# Patient Record
Sex: MALE | Race: BLACK OR AFRICAN AMERICAN | NOT HISPANIC OR LATINO | Employment: OTHER | URBAN - METROPOLITAN AREA
[De-identification: names, ages, dates, MRNs, and addresses within clinical notes are randomized per-mention and may not be internally consistent; named-entity substitution may affect disease eponyms.]

---

## 2020-03-23 ENCOUNTER — TELEPHONE (OUTPATIENT)
Dept: FAMILY MEDICINE | Facility: CLINIC | Age: 65
End: 2020-03-23

## 2020-03-23 NOTE — TELEPHONE ENCOUNTER
----- Message from Carol Patterson sent at 3/23/2020 11:40 AM CDT -----  Contact: MARGARET CHAUHAN  Name of Who is Calling: MARGARET CHAUHAN      What is the request in detail: Would like to speak to staff in regards to wanting to make an appointment to establish care to have the indomethacin (INDOCIN) 25 MG capsule refilled. Please advise.       Can the clinic reply by [facility]: No      [facility]: [phone]

## 2021-10-13 ENCOUNTER — HOSPITAL ENCOUNTER (EMERGENCY)
Facility: OTHER | Age: 66
Discharge: HOME | End: 2021-10-13
Attending: EMERGENCY MEDICINE
Payer: MEDICARE

## 2021-10-13 VITALS
OXYGEN SATURATION: 99 % | DIASTOLIC BLOOD PRESSURE: 86 MMHG | RESPIRATION RATE: 18 BRPM | HEART RATE: 88 BPM | SYSTOLIC BLOOD PRESSURE: 178 MMHG | BODY MASS INDEX: 33.04 KG/M2 | HEIGHT: 68 IN | WEIGHT: 218 LBS | TEMPERATURE: 98 F

## 2021-10-13 DIAGNOSIS — M25.522 LEFT ELBOW PAIN: Primary | ICD-10-CM

## 2021-10-13 DIAGNOSIS — Z87.39 HISTORY OF GOUT: ICD-10-CM

## 2021-10-13 PROCEDURE — 99284 EMERGENCY DEPT VISIT MOD MDM: CPT | Mod: 25

## 2021-10-13 RX ORDER — ALLOPURINOL 100 MG/1
100 TABLET ORAL DAILY
Qty: 30 TABLET | Refills: 0 | Status: SHIPPED | OUTPATIENT
Start: 2021-10-13

## 2021-10-13 RX ORDER — DICLOFENAC SODIUM 10 MG/G
2 GEL TOPICAL 3 TIMES DAILY
Qty: 150 G | Refills: 0 | Status: SHIPPED | OUTPATIENT
Start: 2021-10-13

## 2023-01-18 ENCOUNTER — HOSPITAL ENCOUNTER (EMERGENCY)
Facility: OTHER | Age: 68
Discharge: HOME | End: 2023-01-19
Attending: EMERGENCY MEDICINE
Payer: MEDICARE

## 2023-01-18 DIAGNOSIS — M25.562 ACUTE PAIN OF LEFT KNEE: ICD-10-CM

## 2023-01-18 DIAGNOSIS — M10.9 ACUTE GOUT OF LEFT KNEE, UNSPECIFIED CAUSE: ICD-10-CM

## 2023-01-18 DIAGNOSIS — R52 PAIN: ICD-10-CM

## 2023-01-18 DIAGNOSIS — M25.462 EFFUSION OF LEFT KNEE: Primary | ICD-10-CM

## 2023-01-18 PROCEDURE — 36000 PLACE NEEDLE IN VEIN: CPT

## 2023-01-18 PROCEDURE — 96372 THER/PROPH/DIAG INJ SC/IM: CPT | Performed by: EMERGENCY MEDICINE

## 2023-01-18 PROCEDURE — 99284 EMERGENCY DEPT VISIT MOD MDM: CPT | Mod: 25

## 2023-01-18 PROCEDURE — 63600175 PHARM REV CODE 636 W HCPCS: Performed by: EMERGENCY MEDICINE

## 2023-01-18 RX ORDER — KETOROLAC TROMETHAMINE 30 MG/ML
10 INJECTION, SOLUTION INTRAMUSCULAR; INTRAVENOUS
Status: COMPLETED | OUTPATIENT
Start: 2023-01-18 | End: 2023-01-18

## 2023-01-18 RX ADMIN — KETOROLAC TROMETHAMINE 10 MG: 30 INJECTION, SOLUTION INTRAMUSCULAR; INTRAVENOUS at 09:01

## 2023-01-19 VITALS
DIASTOLIC BLOOD PRESSURE: 68 MMHG | SYSTOLIC BLOOD PRESSURE: 153 MMHG | RESPIRATION RATE: 18 BRPM | TEMPERATURE: 99 F | HEART RATE: 82 BPM | OXYGEN SATURATION: 100 %

## 2023-01-19 PROCEDURE — 25000003 PHARM REV CODE 250: Performed by: EMERGENCY MEDICINE

## 2023-01-19 RX ORDER — COLCHICINE 0.6 MG/1
0.6 TABLET, FILM COATED ORAL
Status: COMPLETED | OUTPATIENT
Start: 2023-01-19 | End: 2023-01-19

## 2023-01-19 RX ORDER — INDOMETHACIN 25 MG/1
25 CAPSULE ORAL
Qty: 15 CAPSULE | Refills: 0 | Status: SHIPPED | OUTPATIENT
Start: 2023-01-19

## 2023-01-19 RX ORDER — COLCHICINE 0.6 MG/1
0.6 TABLET ORAL DAILY
Qty: 30 TABLET | Refills: 0 | Status: SHIPPED | OUTPATIENT
Start: 2023-01-19 | End: 2023-02-18

## 2023-01-19 RX ORDER — DICLOFENAC SODIUM 10 MG/G
2 GEL TOPICAL 4 TIMES DAILY
Qty: 100 G | Refills: 0 | Status: SHIPPED | OUTPATIENT
Start: 2023-01-19

## 2023-01-19 RX ORDER — INDOMETHACIN 25 MG/1
50 CAPSULE ORAL
Status: COMPLETED | OUTPATIENT
Start: 2023-01-19 | End: 2023-01-19

## 2023-01-19 RX ADMIN — COLCHICINE 0.6 MG: 0.6 TABLET, FILM COATED ORAL at 01:01

## 2023-01-19 RX ADMIN — INDOMETHACIN 50 MG: 25 CAPSULE ORAL at 01:01

## 2023-01-19 NOTE — DISCHARGE INSTRUCTIONS
Mr. Rankin,    Thank you for letting me care for you today! It was nice meeting you, and I hope you feel better soon.   If you would like access to your chart and what was done today please utilize the [facility].   Please come back to [facility] for all of your future medical needs.    Our goal in the emergency department is to always give you outstanding care and exceptional service. You may receive a survey by mail or e-mail in the next week regarding your experience in our ED. We would greatly appreciate you completing and returning the survey. Your feedback provides us with a way to recognize our staff who give very good care and it helps us learn how to improve when your experience was below our aspiration of excellence.     Sincerely,    Yao Nichole MD  Board Certified Emergency Physician

## 2023-01-19 NOTE — ED PROVIDER NOTES
Encounter Date: 1/18/2023    SCRIBE #1 NOTE: I, Bisi Rascon, am scribing for, and in the presence of,  Yao Nichole MD.     History     Chief Complaint   Patient presents with    Alcohol Intoxication     Pt c/o trouble ambulating     Time seen by provider: 9:25 PM    Josh Rankin is a 67 y.o. male, with a PMHx of arthritis, HTN, and gout, who presents to the ED with complaint of left knee swelling that began yesterday. The patient reports that he drinks occasionally, but denies cigarette or illicit drug use. He notes that he was drinking at the time his knee began to swell. The patient reports having gout in his left knee in the past, as well as prior surgeries in both knees. He states that he takes Metformin and insulin. This is the extent of the patient's complaints today in the Emergency Department.      The history is provided by the patient.   Review of patient's allergies indicates:  No Known Allergies  Past Medical History:   Diagnosis Date    Diabetes mellitus     Gout, unspecified     Hypertension      Past Surgical History:   Procedure Laterality Date    CHOLECYSTECTOMY      KNEE ARTHROPLASTY      bilateral     No family history on file.  Social History     Tobacco Use    Smoking status: Never   Substance Use Topics    Alcohol use: Yes    Drug use: No     Review of Systems  Constitutional-no fever  HEENT-no congestion  Eyes-no redness  Respiratory-no shortness of breath  Cardio-no chest pain  GI-no abdominal pain  Endocrine-no cold intolerance  -no difficulty urinating  MSK-no myalgias. Notes left knee swelling.  Skin-no rashes  Allergy-no environmental allergy  Neurologic-, no headache  Hematology-no swollen nodes  Behavioral-no confusion   Physical Exam     Initial Vitals [01/18/23 2012]   BP Pulse Resp Temp SpO2   (!) 181/79 (!) 125 18 98.7 °F (37.1 °C) 100 %      MAP       --         Physical Exam  Constitutional: pleasant 68 yo man who appears mildly intoxicated  Eyes: Conjunctivae  normal.  ENT       Head: Normocephalic, atraumatic.       Nose: Normal external appearance        Mouth/Throat: no strigulous respirations   Hematological/Lymphatic/Immunilogical: no visible lymphadenopathy   Cardiovascular: Normal rate,   Respiratory: Normal respiratory effort.   Gastrointestinal: non distended   Musculoskeletal: Normal range of motion in all extremities. Moderate effusion in the left knee, tender to palpation  Strong dp pulse bilaterally  Neurologic: Alert, oriented. Normal speech and language. No gross focal neurologic deficits are appreciated.  Skin: Skin is warm, dry. No rash noted.  Psychiatric: Mood and affect are normal.    ED Course   Procedures  Labs Reviewed - No data to display       Imaging Results               X-Ray Knee 1 or 2 View Left (Final result)  Result time 01/19/23 00:05:05      Final result by Zelda Medina MD (01/19/23 00:05:05)                   Impression:      Question of an acute nondisplaced tibial articular surface fracture involving the lateral tibial spine.    Left knee effusion noted.    Degenerative changes.    This report was flagged in Epic as abnormal.      Electronically signed by: Zelda Medina  Date:    01/19/2023  Time:    00:05               Narrative:    EXAMINATION:  XR KNEE 1 OR 2 VIEW LEFT    CLINICAL HISTORY:  Pain, unspecified    TECHNIQUE:  AP lateral views of left knee were obtained    COMPARISON:  05/13/2014 left knee views    FINDINGS:  There is a small effusion.  Lucency through the base of the lateral tibial spine raises question of small fracture.  There is medial knee compartment narrowing again noted.  No other focal osseous abnormalities.                                       Medications   ketorolac injection 9.999 mg (9.999 mg Intramuscular Given 1/18/23 3482)   indomethacin capsule 50 mg (50 mg Oral Given 1/19/23 0129)   colchicine capsule/tablet 0.6 mg (0.6 mg Oral Given 1/19/23 0129)     Medical Decision Making:   History:   Old  Medical Records: I decided to obtain old medical records.  Old Records Summarized: records from clinic visits and records from previous admission(s).  Differential Diagnosis:   Knee effusion, gout, septic arthritis   Independently Interpreted Test(s):   I have ordered and independently interpreted X-rays - see summary below.       <> Summary of X-Ray Reading(s): Moderate effusion, lateral spine of tibia shows artifact, no over fracture.    Clinical Tests:   Radiological Study: Ordered and Reviewed  ED Management:  67 y stacy with effusion in knee which feels reminiscent  of gout flares in the past. XR shows effusion. No fever, chills systemic illness signs.  Plan for gout care, ortho follow up as needed, dc and return in case of worsening.         Scribe Attestation:   Scribe #1: I performed the above scribed service and the documentation accurately describes the services I performed. I attest to the accuracy of the note.                   Clinical Impression:   Final diagnoses:  [R52] Pain  [M25.462] Effusion of left knee (Primary)  [M25.562] Acute pain of left knee  [M10.9] Acute gout of left knee, unspecified cause        ED Disposition Condition    Discharge Stable          ED Prescriptions       Medication Sig Dispense Start Date End Date Auth. Provider    indomethacin (INDOCIN) 25 MG capsule Take 1 capsule (25 mg total) by mouth 3 (three) times daily with meals. 15 capsule 1/19/2023 -- Yao Nichole MD    colchicine (COLCRYS) 0.6 mg tablet Take 1 tablet (0.6 mg total) by mouth once daily. 30 tablet 1/19/2023 2/18/2023 Yao Nichole MD    diclofenac sodium (VOLTAREN) 1 % Gel Apply 2 g topically 4 (four) times daily. 100 g 1/19/2023 -- Yao Nichole MD          Follow-up Information       Follow up With Specialties Details Why Contact Info Additional Information    Damion Bergeron - Orthopedics [facility] Schedule an appointment as soon as possible for a visit  As needed, For a follow up  visit about today [address]  [facility] [address] [phone] [facility] - Main Building, 5th Floor Please park in [facility] and take Atrium elevator             Yao Nichole MD  01/19/23 2059

## 2023-01-23 ENCOUNTER — TELEPHONE (OUTPATIENT)
Dept: ORTHOPEDICS | Facility: CLINIC | Age: 68
End: 2023-01-23
Payer: MEDICARE

## 2023-08-23 ENCOUNTER — HOSPITAL ENCOUNTER (EMERGENCY)
Facility: OTHER | Age: 68
Discharge: HOME | End: 2023-08-24
Attending: EMERGENCY MEDICINE
Payer: MEDICARE

## 2023-08-23 DIAGNOSIS — F10.920 ALCOHOLIC INTOXICATION WITHOUT COMPLICATION: Primary | ICD-10-CM

## 2023-08-23 PROCEDURE — 96360 HYDRATION IV INFUSION INIT: CPT

## 2023-08-23 PROCEDURE — 25000003 PHARM REV CODE 250: Performed by: EMERGENCY MEDICINE

## 2023-08-23 PROCEDURE — 99284 EMERGENCY DEPT VISIT MOD MDM: CPT | Mod: 25

## 2023-08-23 RX ORDER — ACETAMINOPHEN 325 MG/1
650 TABLET ORAL
Status: COMPLETED | OUTPATIENT
Start: 2023-08-23 | End: 2023-08-23

## 2023-08-23 RX ORDER — SODIUM CHLORIDE 9 MG/ML
1000 INJECTION, SOLUTION INTRAVENOUS
Status: COMPLETED | OUTPATIENT
Start: 2023-08-23 | End: 2023-08-23

## 2023-08-23 RX ADMIN — ACETAMINOPHEN 650 MG: 325 TABLET, FILM COATED ORAL at 10:08

## 2023-08-23 RX ADMIN — SODIUM CHLORIDE 1000 ML: 9 INJECTION, SOLUTION INTRAVENOUS at 10:08

## 2023-08-24 VITALS
DIASTOLIC BLOOD PRESSURE: 89 MMHG | RESPIRATION RATE: 16 BRPM | TEMPERATURE: 99 F | OXYGEN SATURATION: 99 % | SYSTOLIC BLOOD PRESSURE: 172 MMHG | HEART RATE: 74 BPM

## 2023-08-24 PROCEDURE — 25000003 PHARM REV CODE 250: Performed by: EMERGENCY MEDICINE

## 2023-08-24 RX ORDER — ACETAMINOPHEN 325 MG/1
325 TABLET ORAL
Status: COMPLETED | OUTPATIENT
Start: 2023-08-24 | End: 2023-08-24

## 2023-08-24 RX ADMIN — ACETAMINOPHEN 325 MG: 325 TABLET, FILM COATED ORAL at 12:08

## 2023-08-24 NOTE — ED NOTES
Patient resting comfortably . Fluids infusing , site wnl . Warm blanket provided for comfort . Denies further needs at this time. Will continue to monitor.

## 2023-08-24 NOTE — ED PROVIDER NOTES
Encounter Date: 8/23/2023    SCRIBE #1 NOTE: I, Rebecca Layne, am scribing for, and in the presence of,  Duyen Benton MD. I have scribed the following portions of the note - Other sections scribed: HPI, ROS, PE.       History     Chief Complaint   Patient presents with    Alcohol Intoxication     Pt arrives via [facility]asi after calling ems for SOB. Pt's RA sat 99% and has even/unlabored respirations. Pt admits to etoh use tonight.      Josh Rankin is a 67 y.o. male who presents to the ED c/o dizziness and generalized headache that started after EtOH use this evening. Pt presented via EMS with initial complaint of shortness of breath.  However, during my assessment he did not report dyspnea.  He denies any other complaints at this time.    The history is provided by medical records and the patient. No  was used.     Review of patient's allergies indicates:   Allergen Reactions    Ibuprofen      Past Medical History:   Diagnosis Date    Diabetes mellitus     Gout, unspecified     Hypertension      Past Surgical History:   Procedure Laterality Date    CHOLECYSTECTOMY      KNEE ARTHROPLASTY      bilateral     History reviewed. No pertinent family history.  Social History     Tobacco Use    Smoking status: Never   Substance Use Topics    Alcohol use: Yes    Drug use: No     Review of Systems   Constitutional:  Negative for fever.   HENT:  Negative for sore throat.    Respiratory:  Negative for shortness of breath.    Cardiovascular:  Negative for chest pain.   Gastrointestinal:  Negative for nausea.   Genitourinary:  Negative for dysuria.   Musculoskeletal:  Negative for back pain.   Skin:  Negative for rash.   Neurological:  Positive for dizziness and headaches. Negative for weakness.   Hematological:  Does not bruise/bleed easily.   All other systems reviewed and are negative.      Physical Exam     Initial Vitals [08/23/23 2043]   BP Pulse Resp Temp SpO2   (!) 149/76 104 18 98.2 °F (36.8 °C) 99 %       MAP       --         Physical Exam    Nursing note and vitals reviewed.  Constitutional: He appears well-developed and well-nourished. He is not diaphoretic. No distress.   Smells of alcohol.    HENT:   Head: Normocephalic and atraumatic.   Right Ear: External ear normal.   Left Ear: External ear normal.   Head atraumatic.    Eyes: Conjunctivae and EOM are normal.   Neck:   Normal range of motion.  Cardiovascular:  Normal rate, regular rhythm and normal heart sounds.     Exam reveals no friction rub.       No murmur heard.  Pulmonary/Chest: Effort normal and breath sounds normal. No respiratory distress. He has no wheezes. He has no rhonchi. He has no rales.   Abdominal: There is no guarding.   Musculoskeletal:         General: Normal range of motion.      Cervical back: Normal range of motion.     Neurological: He is alert and oriented to person, place, and time. He has normal strength. No sensory deficit. GCS eye subscore is 4. GCS verbal subscore is 5. GCS motor subscore is 6.   Alert to person and place. Slurred speech.    Skin: No rash noted.   Psychiatric: He has a normal mood and affect. His behavior is normal. Thought content normal.         ED Course   Procedures  Labs Reviewed - No data to display       Imaging Results              CT Head Without Contrast (Final result)  Result time 08/24/23 01:25:02      Final result by Judy Ni MD (08/24/23 01:25:02)                   Impression:      No acute intracranial abnormalities identified.      Electronically signed by: Judy Ni MD  Date:    08/24/2023  Time:    01:25               Narrative:    EXAMINATION:  CT HEAD WITHOUT CONTRAST    CLINICAL HISTORY:  Head trauma, minor (Age >= 65y);    TECHNIQUE:  Low dose axial images were obtained through the head.  Coronal and sagittal reformations were also performed. Contrast was not administered.    COMPARISON:  None.    FINDINGS:  There is mild chronic microvascular ischemic disease.  Small remote  lacunar infarcts are seen within the bilateral basal ganglia.  No evidence of acute/recent major vascular distribution cerebral infarction, intraparenchymal hemorrhage, or intra-axial space occupying lesion. The ventricular system is normal in size and configuration with no evidence of hydrocephalus. No effacement of the skull-base cisterns. No abnormal extra-axial fluid collections or blood products.  There is right sphenoid sinus disease.  Remaining visualized paranasal sinuses and mastoid air cells are clear. The calvarium shows no significant abnormality.                                       Medications   0.9%  NaCl infusion (0 mLs Intravenous Stopped 8/23/23 2330)   acetaminophen tablet 650 mg (650 mg Oral Given 8/23/23 2212)   acetaminophen tablet 325 mg (325 mg Oral Given 8/24/23 0044)     Medical Decision Making  67-year-old male presents intoxicated with complaint of the headache and dizziness after drinking.  Triage vital signs with mild tachycardia.  No evidence of trauma on exam.  Will give IV fluids and Tylenol and observe until clinically sober.    On re-evaluation the patient continued to report a headache and was given additional dose of Tylenol and CT of the head was obtained.  CT negative for any acute process.      At the time of discharge the patient was clinically sober and able to ambulate without difficulty.  He was discharged home in stable condition    Amount and/or Complexity of Data Reviewed  Radiology: ordered.    Risk  OTC drugs.  Prescription drug management.            Scribe Attestation:   Scribe #1: I performed the above scribed service and the documentation accurately describes the services I performed. I attest to the accuracy of the note.                        Clinical Impression:   Final diagnoses:  [F10.920] Alcoholic intoxication without complication (Primary)        ED Disposition Condition    Discharge Stable          ED Prescriptions    None       Follow-up Information        Follow up With Specialties Details Why Contact Info    Rosalie Causey MD Family Medicine   [address]  [place] [address]  [phone]            Physician Attestation for Scribe: I, Duyen Benton  , reviewed documentation as scribed in my presence, which is both accurate and complete.       Duyen Benton MD  08/24/23 9472

## 2023-08-24 NOTE — ED TRIAGE NOTES
Pt arrives via [facility] after calling ems for SOB. Pt's RA sat 99% and has even/unlabored respirations. Pt admits to etoh use tonight

## 2023-10-07 ENCOUNTER — HOSPITAL ENCOUNTER (EMERGENCY)
Facility: OTHER | Age: 68
Discharge: HOME | End: 2023-10-07
Attending: EMERGENCY MEDICINE
Payer: MEDICARE

## 2023-10-07 VITALS
RESPIRATION RATE: 18 BRPM | OXYGEN SATURATION: 99 % | DIASTOLIC BLOOD PRESSURE: 103 MMHG | TEMPERATURE: 98 F | SYSTOLIC BLOOD PRESSURE: 208 MMHG | BODY MASS INDEX: 33.04 KG/M2 | HEIGHT: 68 IN | WEIGHT: 218 LBS | HEART RATE: 102 BPM

## 2023-10-07 DIAGNOSIS — I10 HYPERTENSION, UNSPECIFIED TYPE: ICD-10-CM

## 2023-10-07 DIAGNOSIS — M25.511 CHRONIC RIGHT SHOULDER PAIN: ICD-10-CM

## 2023-10-07 DIAGNOSIS — G89.29 CHRONIC RIGHT SHOULDER PAIN: ICD-10-CM

## 2023-10-07 DIAGNOSIS — F10.920 ALCOHOLIC INTOXICATION WITHOUT COMPLICATION: Primary | ICD-10-CM

## 2023-10-07 DIAGNOSIS — F10.10 ALCOHOL ABUSE: ICD-10-CM

## 2023-10-07 PROCEDURE — 25000003 PHARM REV CODE 250: Performed by: EMERGENCY MEDICINE

## 2023-10-07 PROCEDURE — 99283 EMERGENCY DEPT VISIT LOW MDM: CPT

## 2023-10-07 RX ORDER — DICLOFENAC SODIUM 10 MG/G
2 GEL TOPICAL 4 TIMES DAILY
Qty: 100 G | Refills: 0 | Status: SHIPPED | OUTPATIENT
Start: 2023-10-07

## 2023-10-07 RX ORDER — LIDOCAINE 50 MG/G
1 PATCH TOPICAL
Status: DISCONTINUED | OUTPATIENT
Start: 2023-10-07 | End: 2023-10-08 | Stop reason: HOSPADM

## 2023-10-07 RX ORDER — HYDROCHLOROTHIAZIDE 25 MG/1
25 TABLET ORAL
Status: COMPLETED | OUTPATIENT
Start: 2023-10-07 | End: 2023-10-07

## 2023-10-07 RX ADMIN — HYDROCHLOROTHIAZIDE 25 MG: 25 TABLET ORAL at 10:10

## 2023-10-07 RX ADMIN — LIDOCAINE 1 PATCH: 50 PATCH CUTANEOUS at 07:10

## 2023-10-08 NOTE — ED TRIAGE NOTES
"Pt states his "wife is worried" about him. Presents with ETOH intoxication, pt admits to having several beers since at least this morning but cannot recall when exactly he began drinking. States he drinks everyday and does not know hoe many drinks he normally consumes. Pt denies falls and LOC. VSS, NAD, safety measures in place, will continue to monitor.  "

## 2023-10-08 NOTE — ED PROVIDER NOTES
Encounter Date: 10/7/2023    SCRIBE #1 NOTE: I, Lala Dickinson, am scribing for, and in the presence of,  Yao Nichole MD. I have scribed the following portions of the note - Other sections scribed: HPI, ROS.       History     Chief Complaint   Patient presents with    Alcohol Intoxication     +ETOH use      Time seen by provider: 7:22 PM    This is a 67 y.o. male with PMHx of diabetes, gout and HTN, well known to this emergency department for alcohol abuse who presents with complaint of right shoulder pain. Patient is unsure when pain began. Patient presents to the ED alcohol intoxicated.  Further history is limited secondary to this gentleman's intoxicated status      The history is provided by the patient.     Review of patient's allergies indicates:   Allergen Reactions    Ibuprofen      Past Medical History:   Diagnosis Date    Diabetes mellitus     Gout, unspecified     Hypertension      Past Surgical History:   Procedure Laterality Date    CHOLECYSTECTOMY      KNEE ARTHROPLASTY      bilateral     No family history on file.  Social History     Tobacco Use    Smoking status: Never   Substance Use Topics    Alcohol use: Yes    Drug use: No     Review of Systems  Constitutional-no fever  HEENT-no congestion  Eyes-no redness  Respiratory-no shortness of breath  Cardio-no chest pain  GI-no abdominal pain  Endocrine-no cold intolerance  -no difficulty urinating  MSK-positive shoulder pain  Skin-no rashes  Allergy-no environmental allergy  Neurologic-, no headache  Hematology-no swollen nodes  Behavioral-no confusion    Physical Exam     Initial Vitals [10/07/23 1839]   BP Pulse Resp Temp SpO2   (!) 138/92 96 18 98.4 °F (36.9 °C) 100 %      MAP       --         Physical Exam  Constitutional:  Generally well-appearing 67-year-old man in no obvious distress  Eyes: Conjunctivae normal.  ENT       Head: Normocephalic, atraumatic.       Nose: Normal external appearance        Mouth/Throat: no strigulous  respirations   Hematological/Lymphatic/Immunilogical: no visible lymphadenopathy   Cardiovascular: Normal rate,   Respiratory: Normal respiratory effort.   Gastrointestinal: non distended   Musculoskeletal: Normal range of motion in all extremities.  Mild tenderness to palpation in the right supraspinatus  Neurologic: Alert, oriented. Normal speech and language. No gross focal neurologic deficits are appreciated.  Skin: Skin is warm, dry. No rash noted.  Psychiatric: Mood and affect are normal.   ED Course   Procedures  Labs Reviewed - No data to display       Imaging Results    None          Medications   hydroCHLOROthiazide tablet 25 mg (25 mg Oral Given 10/7/23 2237)     Medical Decision Making  Differential diagnosis-intoxication, malingering, shoulder strain, hypertension, alcohol abuse, alcoholism   This gentleman has chronic right-sided shoulder pain which he is complaining of tonight in addition to alcohol intoxication.    Says that he does take medication occasionally to help with this which offered some relief.    Lidoderm patch has been applied to the shoulder.    Clinically observed in the emergency department for significant period of time until clinical sobriety was appreciable, he was ambulatory with minimal assistance, a trusted ride was contacted and arrived for this patient.    Problems Addressed:  Alcohol abuse: chronic illness or injury with exacerbation, progression, or side effects of treatment  Alcoholic intoxication without complication: acute illness or injury  Chronic right shoulder pain: chronic illness or injury with exacerbation, progression, or side effects of treatment  Hypertension, unspecified type: chronic illness or injury with exacerbation, progression, or side effects of treatment    Amount and/or Complexity of Data Reviewed  External Data Reviewed: labs, radiology, ECG and notes.     Details: Multiple presentations previously for alcohol intoxication    Risk  OTC  drugs.  Prescription drug management.  Decision regarding hospitalization.  Diagnosis or treatment significantly limited by social determinants of health.            Scribe Attestation:   Scribe #1: I performed the above scribed service and the documentation accurately describes the services I performed. I attest to the accuracy of the note.              Physician Attestation for Scribe: I, yao nichole, reviewed documentation as scribed in my presence, which is both accurate and complete.            Clinical Impression:   Final diagnoses:  [F10.920] Alcoholic intoxication without complication (Primary)  [M25.511, G89.29] Chronic right shoulder pain  [I10] Hypertension, unspecified type  [F10.10] Alcohol abuse        ED Disposition Condition    Discharge Stable          ED Prescriptions       Medication Sig Dispense Start Date End Date Auth. Provider    diclofenac sodium (VOLTAREN) 1 % Gel Apply 2 g topically 4 (four) times daily. 100 g 10/7/2023 -- Yao Nichole MD          Follow-up Information       Follow up With Specialties Details Why Contact Info    Rosalei Causey MD Family Medicine Call in 2 days If symptoms worsen, For a follow up visit about today [address]  [facility] [address]  [phone]               Yao Nichole MD  10/08/23 5544

## 2023-10-14 PROCEDURE — 29125 APPL SHORT ARM SPLINT STATIC: CPT | Mod: RT

## 2023-10-14 PROCEDURE — 99284 EMERGENCY DEPT VISIT MOD MDM: CPT

## 2023-10-15 ENCOUNTER — HOSPITAL ENCOUNTER (EMERGENCY)
Facility: OTHER | Age: 68
Discharge: HOME | End: 2023-10-15
Attending: INTERNAL MEDICINE
Payer: MEDICARE

## 2023-10-15 VITALS
TEMPERATURE: 98 F | SYSTOLIC BLOOD PRESSURE: 189 MMHG | DIASTOLIC BLOOD PRESSURE: 86 MMHG | RESPIRATION RATE: 16 BRPM | HEART RATE: 88 BPM | OXYGEN SATURATION: 98 %

## 2023-10-15 DIAGNOSIS — R52 PAIN: ICD-10-CM

## 2023-10-15 DIAGNOSIS — I10 UNCONTROLLED HYPERTENSION: ICD-10-CM

## 2023-10-15 DIAGNOSIS — M79.601 RIGHT ARM PAIN: ICD-10-CM

## 2023-10-15 DIAGNOSIS — S52.614A CLOSED NONDISPLACED FRACTURE OF STYLOID PROCESS OF RIGHT ULNA, INITIAL ENCOUNTER: Primary | ICD-10-CM

## 2023-10-15 PROCEDURE — 93005 ELECTROCARDIOGRAM TRACING: CPT

## 2023-10-15 PROCEDURE — 93010 ELECTROCARDIOGRAM REPORT: CPT | Mod: ,,, | Performed by: INTERNAL MEDICINE

## 2023-10-15 PROCEDURE — 25000003 PHARM REV CODE 250: Performed by: INTERNAL MEDICINE

## 2023-10-15 PROCEDURE — 93010 EKG 12-LEAD: ICD-10-PCS | Mod: ,,, | Performed by: INTERNAL MEDICINE

## 2023-10-15 RX ORDER — ACETAMINOPHEN 500 MG
1000 TABLET ORAL
Status: COMPLETED | OUTPATIENT
Start: 2023-10-15 | End: 2023-10-15

## 2023-10-15 RX ORDER — AMLODIPINE BESYLATE 5 MG/1
5 TABLET ORAL
Status: COMPLETED | OUTPATIENT
Start: 2023-10-15 | End: 2023-10-15

## 2023-10-15 RX ORDER — LISINOPRIL 5 MG/1
5 TABLET ORAL
Status: COMPLETED | OUTPATIENT
Start: 2023-10-15 | End: 2023-10-15

## 2023-10-15 RX ADMIN — AMLODIPINE BESYLATE 5 MG: 5 TABLET ORAL at 01:10

## 2023-10-15 RX ADMIN — ACETAMINOPHEN 1000 MG: 500 TABLET ORAL at 01:10

## 2023-10-15 RX ADMIN — LISINOPRIL 5 MG: 5 TABLET ORAL at 01:10

## 2023-10-15 NOTE — DISCHARGE INSTRUCTIONS
Diagnosis:   1. Closed nondisplaced fracture of styloid process of right ulna, initial encounter    2. Pain    3. Right arm pain    4. Uncontrolled hypertension        Tests you had showed:   Labs Reviewed - No data to display   X-Ray Elbow Complete Right    (Results Pending)   X-Ray Wrist Complete Right    (Results Pending)   X-ray Shoulder 2 or More Views Right    (Results Pending)       Treatments you received were:   Medications   acetaminophen tablet 1,000 mg (1,000 mg Oral Given 10/15/23 0148)   amLODIPine tablet 5 mg (5 mg Oral Given 10/15/23 0150)   lisinopriL tablet 5 mg (5 mg Oral Given 10/15/23 0149)       Home Care Instructions:  - Medications: Continue taking your home medications as prescribed    Follow-Up Plan:  - Follow-up with: Primary care doctor within 1-2  days  - Additional testing and/or evaluation will be directed by your primary doctor    Return to the Emergency Department for symptoms including but not limited to: worsening symptoms, severe back pain, shortness of breath or chest pain, vomiting with inability to hold down fluids, blood in vomit or poop, fevers greater than 100.4°F, passing out/fainting/unconsciousness, or other concerning symptoms.     No future appointments.

## 2023-10-15 NOTE — ED PROVIDER NOTES
Encounter date: 1:16 AM 10/15/2023    Source of History   Patient    Chief Complaint   Pt presents with:   Arm Injury (Reports atraumatic right arm pain for 2 weeks, chronic issues after getting kicked by horse 5 yrs ago. No deformities noted, active ROM to RUE)      History Of Present Illness   Josh Rankin is a 67 y.o. male who presents to the ED with complaint of right arm pain. Patient reports getting kicked by a horse 5 years ago and notes chronic right arm pain. Patient notes he went to the hospital 1 week ago for arm pain and reports that he left prior to being seen. He reports having right shoulder pain and arm pain that is worse in the right wrist.  He states this pain acutely worsened in the last 2 weeks.  He denies any recent falls or trauma.  Patient reports taking OTC icy hot, tylenol, and motrin for pain. Associated pain in upper back. He notes he has high blood pressure and that he is supposed to be taking amlodipine and lisinopril but is not taking the medication because it makes his nose bleed. He reports he is waiting on his primary care doctor to start him on a different set of medications. He denies allergies to medications.     Denies: SOB or chest pain.   This is the extent to the patients complaints today here in the emergency department.  Past History     Review of patient's allergies indicates:   Allergen Reactions    Ibuprofen        No current facility-administered medications on file prior to encounter.     Current Outpatient Medications on File Prior to Encounter   Medication Sig Dispense Refill    allopurinoL (ZYLOPRIM) 100 MG tablet Take 1 tablet (100 mg total) by mouth once daily. 30 tablet 0    colchicine (COLCRYS) 0.6 mg tablet Take 1 tablet (0.6 mg total) by mouth once daily. 30 tablet 0    diclofenac sodium (VOLTAREN) 1 % Gel Apply 2 g topically 3 (three) times daily. 150 g 0    diclofenac sodium (VOLTAREN) 1 % Gel Apply 2 g topically 4 (four) times daily. 100 g 0    diclofenac  sodium (VOLTAREN) 1 % Gel Apply 2 g topically 4 (four) times daily. 100 g 0    hydrochlorothiazide (HYDRODIURIL) 12.5 MG Tab Take 12.5 mg by mouth once daily.      hydrocodone-acetaminophen 7.5-325mg (NORCO) 7.5-325 mg per tablet Take 1 tablet by mouth every 6 (six) hours as needed for Pain.      indomethacin (INDOCIN) 25 MG capsule Take 1 capsule (25 mg total) by mouth 3 (three) times daily with meals. 15 capsule 0    lisinopril (PRINIVIL,ZESTRIL) 5 MG tablet Take 5 mg by mouth once daily.      metformin (GLUCOPHAGE) 500 MG tablet Take 500 mg by mouth 2 (two) times daily with meals.      ondansetron (ZOFRAN) 4 MG tablet Take 1 tablet (4 mg total) by mouth every 8 (eight) hours as needed for Nausea. 12 tablet 0    tramadol (ULTRAM) 50 mg tablet Take 50 mg by mouth every 6 (six) hours as needed for Pain.         As per HPI and below:  Past Medical History:   Diagnosis Date    Diabetes mellitus     Gout, unspecified     Hypertension      Past Surgical History:   Procedure Laterality Date    CHOLECYSTECTOMY      KNEE ARTHROPLASTY      bilateral       Social History     Socioeconomic History    Marital status:    Tobacco Use    Smoking status: Never   Substance and Sexual Activity    Alcohol use: Yes    Drug use: No       No family history on file.    Physical Exam     Vitals:    10/15/23 0202 10/15/23 0343 10/15/23 0402 10/15/23 0514   BP: (!) 221/105 (!) 206/90 (!) 197/81 (!) 189/86   Pulse: 89 91 94 88   Resp:    16   Temp:       TempSrc:       SpO2: 100% 99% 98% 98%     Physical Exam:   Nursing note and vitals reviewed.  Appearance: Well appearing, non-toxic male in no acute respiratory distress.  Making purposeful movements.  Speaking in full sentences.  Skin: No obvious rashes seen.  Good turgor.  No abrasions.  No ecchymoses.  Eyes: No conjunctival injection. EOMI, PERRL.  Head: No spinal tenderness.   ENT: Oropharynx clear.    Chest: CTAB. No increased work of breathing, bilateral chest  rise.  Cardiovascular: Regular rate and rhythm.  Normal equal bilateral radial pulses.  Abdomen: Soft.  Not distended.  Nontender.  No guarding.  No rebound. No Masses  Musculoskeletal: Good range of motion all joints with full range of motion of bilateral shoulders.  No deformities.  Neck supple, full range of motion, no obvious deformity. No tenderness to palpation of cervical through lumbar spine.  No step-offs or deformities.  Noted tenderness to palpation of right distal ulna.  No tenderness upon palpation of anatomical snuffbox bilaterally.  Neurologic: Moves all extremities.  Normal sensation.  No facial droop.  Normal speech.    Mental Status:  Alert and oriented x 3.  Appropriate, conversant.      Results and Medications    Procedures    Labs Reviewed - No data to display    Imaging Results              X-Ray Elbow Complete Right (In process)                      X-ray Shoulder 2 or More Views Right (In process)  Result time 10/15/23 02:19:24   Procedure changed from X-Ray Shoulder Trauma Right                    X-Ray Wrist Complete Right (In process)                         Medications   acetaminophen tablet 1,000 mg (1,000 mg Oral Given 10/15/23 0148)   amLODIPine tablet 5 mg (5 mg Oral Given 10/15/23 0150)   lisinopriL tablet 5 mg (5 mg Oral Given 10/15/23 0149)       MDM, Impression and Plan   Independently Interpreted Test(s):   EKG:  I independently reviewed and interpreted the EKG and my findings are as follows:   Detailed here or in ED course.  EKG shows normal sinus rhythm with ventricular rate of 88 beats per minute.  Noted LVH.  Noted nonspecific T-wave abnormalities.  No STEMI    Clinical Tests:   Lab Tests: Ordered and Reviewed  Radiological Study: Ordered and Reviewed  Medical Tests: Ordered and Reviewed    Initial Assessment & ED Management:    Urgent evaluation of 67 y.o. male with History as above who presents the ED with chief of worsening right upper extremity pain after an incident 5  years ago with acute worsening in the last 2 weeks.  He denies any inciting event into his recent new pain.  On arrival to the ED he is noted to be afebrile, hypertensive in no acute respiratory distress.  Of note he has hypertension but does not take home blood pressure medications.  He states he has a primary care doctor who is in the process of changing his medications.  He was agreeable with taking his home blood pressure medications with reduction in his blood pressure.  His pain was controlled with Tylenol. All x-rays were reviewed:  Was noted to have a irregularity on the distal humerus yet he had no pain on palpation at this area.  Right shoulder imaging with no acute abnormality.  Questionable subtle acute fracture of the ulnar styloid.  I discussed the case with Orthopedic surgery who recommended a short-arm splint and outpatient orthopedic follow-up as there no signs of compartment syndrome and he is neurovascularly intact.  He is provided with ambulatory referral to Orthopedic surgery and their phone number, instructed to take Motrin Tylenol for pain.  Put in a short arm splint and instructed to follow up with his PCP for blood pressure checks and antihypertensive medication adjustments.  Care plan addressed with patient and all those present. All questions answered.  Strict return precautions discussed.  Patient was instructed on the correct follow-up time and route.  They voiced verbal understanding and agreement  with the plan and were deemed stable for discharge.       Medical Decision Making  Amount and/or Complexity of Data Reviewed  Radiology: ordered.    Risk  OTC drugs.  Prescription drug management.               I called and discussed the case with:  Orthopedic surgery    Please see ED course for discussion of workup and dispo if not listed above.          Final diagnoses:  [R52] Pain  [M79.601] Right arm pain  [S52.614A] Closed nondisplaced fracture of styloid process of right ulna, initial  encounter (Primary)  [I10] Uncontrolled hypertension        ED Disposition Condition    Discharge Stable          ED Prescriptions    None       Follow-up Information       Follow up With Specialties Details Why Contact Info    Rosalie Causey MD Family Medicine In 2 days for blood pressure check and conversation on meds [address]  [place] [address]  [phone]      Williams, Claude S. IV, MD Orthopedic Surgery Schedule an appointment as soon as possible for a visit in 3 days  [address]  [facility] [address]  [phone]              ED Course as of 10/15/23 0928   [place] Oct 15, 2023   0414 Williams, Claude S. IV, MD short arm splint ortho follow up  [HM]      ED Course User Index  [HM] Viet Mccall MD Heyward Mack, MD Mack, Heyward B, MD  10/15/23 0928

## 2023-10-16 ENCOUNTER — TELEPHONE (OUTPATIENT)
Dept: ORTHOPEDICS | Facility: CLINIC | Age: 68
End: 2023-10-16
Payer: MEDICARE

## 2023-10-16 NOTE — TELEPHONE ENCOUNTER
Called patient and left a voicemail to return our call regarding scheduling him with the hand clinic

## 2023-10-20 ENCOUNTER — TELEPHONE (OUTPATIENT)
Dept: ORTHOPEDICS | Facility: CLINIC | Age: 68
End: 2023-10-20
Payer: MEDICARE

## 2023-10-20 DIAGNOSIS — M25.531 RIGHT WRIST PAIN: Primary | ICD-10-CM

## 2023-10-23 ENCOUNTER — TELEPHONE (OUTPATIENT)
Dept: ORTHOPEDICS | Facility: CLINIC | Age: 68
End: 2023-10-23
Payer: MEDICARE

## 2023-10-23 NOTE — TELEPHONE ENCOUNTER
YUE c Pt. confirming appt. location & time on 10/26/23, including XR, curtis Meléndez. Requested a call back to the [facility] at [phone] with any questions, concerns or need for a different appointment time.

## 2023-12-13 ENCOUNTER — TELEPHONE (OUTPATIENT)
Dept: ORTHOPEDICS | Facility: CLINIC | Age: 68
End: 2023-12-13
Payer: MEDICARE

## 2023-12-13 NOTE — TELEPHONE ENCOUNTER
Spoke c pt. Confirmed appt with xray at 1:15 c Dr. Cochran. Patient expressed understanding & was thankful.

## 2023-12-19 ENCOUNTER — TELEPHONE (OUTPATIENT)
Dept: ORTHOPEDICS | Facility: CLINIC | Age: 68
End: 2023-12-19
Payer: MEDICARE

## 2023-12-19 ENCOUNTER — HOSPITAL ENCOUNTER (OUTPATIENT)
Dept: RADIOLOGY | Facility: OTHER | Age: 68
Discharge: HOME | End: 2023-12-19
Attending: ORTHOPAEDIC SURGERY
Payer: MEDICARE

## 2023-12-19 DIAGNOSIS — M25.531 RIGHT WRIST PAIN: ICD-10-CM

## 2023-12-19 PROCEDURE — 73110 XR WRIST COMPLETE 3 VIEWS RIGHT: ICD-10-PCS | Mod: 26,RT,, | Performed by: RADIOLOGY

## 2023-12-19 PROCEDURE — 73110 X-RAY EXAM OF WRIST: CPT | Mod: TC,FY,RT

## 2023-12-19 PROCEDURE — 73110 X-RAY EXAM OF WRIST: CPT | Mod: 26,RT,, | Performed by: RADIOLOGY

## 2023-12-21 ENCOUNTER — OFFICE VISIT (OUTPATIENT)
Dept: ORTHOPEDICS | Facility: CLINIC | Age: 68
End: 2023-12-21
Payer: MEDICARE

## 2023-12-21 VITALS — HEIGHT: 68 IN | BODY MASS INDEX: 33.05 KG/M2 | WEIGHT: 218.06 LBS

## 2023-12-21 DIAGNOSIS — S52.601A CLOSED FRACTURE OF DISTAL END OF RIGHT ULNA, UNSPECIFIED FRACTURE MORPHOLOGY, INITIAL ENCOUNTER: Primary | ICD-10-CM

## 2023-12-21 DIAGNOSIS — M25.531 RIGHT WRIST PAIN: ICD-10-CM

## 2023-12-21 PROCEDURE — 4010F PR ACE/ARB THEARPY RXD/TAKEN: ICD-10-PCS | Mod: CPTII,S$GLB,, | Performed by: SPECIALIST/TECHNOLOGIST

## 2023-12-21 PROCEDURE — 3288F PR FALLS RISK ASSESSMENT DOCUMENTED: ICD-10-PCS | Mod: CPTII,S$GLB,, | Performed by: SPECIALIST/TECHNOLOGIST

## 2023-12-21 PROCEDURE — 1101F PT FALLS ASSESS-DOCD LE1/YR: CPT | Mod: CPTII,S$GLB,, | Performed by: SPECIALIST/TECHNOLOGIST

## 2023-12-21 PROCEDURE — 99999 PR PBB SHADOW E&M-EST. PATIENT-LVL III: ICD-10-PCS | Mod: PBBFAC,,, | Performed by: SPECIALIST/TECHNOLOGIST

## 2023-12-21 PROCEDURE — 99999 PR PBB SHADOW E&M-EST. PATIENT-LVL III: CPT | Mod: PBBFAC,,, | Performed by: SPECIALIST/TECHNOLOGIST

## 2023-12-21 PROCEDURE — 1159F PR MEDICATION LIST DOCUMENTED IN MEDICAL RECORD: ICD-10-PCS | Mod: CPTII,S$GLB,, | Performed by: SPECIALIST/TECHNOLOGIST

## 2023-12-21 PROCEDURE — 99203 PR OFFICE/OUTPT VISIT, NEW, LEVL III, 30-44 MIN: ICD-10-PCS | Mod: S$GLB,,, | Performed by: SPECIALIST/TECHNOLOGIST

## 2023-12-21 PROCEDURE — 1159F MED LIST DOCD IN RCRD: CPT | Mod: CPTII,S$GLB,, | Performed by: SPECIALIST/TECHNOLOGIST

## 2023-12-21 PROCEDURE — 3008F BODY MASS INDEX DOCD: CPT | Mod: CPTII,S$GLB,, | Performed by: SPECIALIST/TECHNOLOGIST

## 2023-12-21 PROCEDURE — 4010F ACE/ARB THERAPY RXD/TAKEN: CPT | Mod: CPTII,S$GLB,, | Performed by: SPECIALIST/TECHNOLOGIST

## 2023-12-21 PROCEDURE — 99203 OFFICE O/P NEW LOW 30 MIN: CPT | Mod: S$GLB,,, | Performed by: SPECIALIST/TECHNOLOGIST

## 2023-12-21 PROCEDURE — 3288F FALL RISK ASSESSMENT DOCD: CPT | Mod: CPTII,S$GLB,, | Performed by: SPECIALIST/TECHNOLOGIST

## 2023-12-21 PROCEDURE — 1125F AMNT PAIN NOTED PAIN PRSNT: CPT | Mod: CPTII,S$GLB,, | Performed by: SPECIALIST/TECHNOLOGIST

## 2023-12-21 PROCEDURE — 3008F PR BODY MASS INDEX (BMI) DOCUMENTED: ICD-10-PCS | Mod: CPTII,S$GLB,, | Performed by: SPECIALIST/TECHNOLOGIST

## 2023-12-21 PROCEDURE — 1125F PR PAIN SEVERITY QUANTIFIED, PAIN PRESENT: ICD-10-PCS | Mod: CPTII,S$GLB,, | Performed by: SPECIALIST/TECHNOLOGIST

## 2023-12-21 PROCEDURE — 1101F PR PT FALLS ASSESS DOC 0-1 FALLS W/OUT INJ PAST YR: ICD-10-PCS | Mod: CPTII,S$GLB,, | Performed by: SPECIALIST/TECHNOLOGIST

## 2023-12-21 NOTE — PROGRESS NOTES
Subjective:      Patient ID: Josh Rankin is a 68 y.o. male.    Chief Complaint: Numbness of the Right Wrist      HPI  Josh Rankin is a right hand dominant 68 y.o. male presenting today for Closed Distal Ulna Fracture.  Patient reports that on October 15th he went to the emergency room for wrist pain.  It was discovered that he had a distal ulna fracture.  He was subsequently placed into a cast.  He was scheduled for follow up multiple times with our office but was unable to make appointments.  He has been in a cast for the past 2 months.  He reports today that he is doing much better.  He denies any numbness or tingling or any wrist pain at this time.  He states his cast was removed yesterday.    Review of patient's allergies indicates:   Allergen Reactions    Ibuprofen          Current Outpatient Medications   Medication Sig Dispense Refill    allopurinoL (ZYLOPRIM) 100 MG tablet Take 1 tablet (100 mg total) by mouth once daily. 30 tablet 0    diclofenac sodium (VOLTAREN) 1 % Gel Apply 2 g topically 3 (three) times daily. 150 g 0    diclofenac sodium (VOLTAREN) 1 % Gel Apply 2 g topically 4 (four) times daily. 100 g 0    diclofenac sodium (VOLTAREN) 1 % Gel Apply 2 g topically 4 (four) times daily. 100 g 0    hydrochlorothiazide (HYDRODIURIL) 12.5 MG Tab Take 12.5 mg by mouth once daily.      hydrocodone-acetaminophen 7.5-325mg (NORCO) 7.5-325 mg per tablet Take 1 tablet by mouth every 6 (six) hours as needed for Pain.      indomethacin (INDOCIN) 25 MG capsule Take 1 capsule (25 mg total) by mouth 3 (three) times daily with meals. 15 capsule 0    lisinopril (PRINIVIL,ZESTRIL) 5 MG tablet Take 5 mg by mouth once daily.      metformin (GLUCOPHAGE) 500 MG tablet Take 500 mg by mouth 2 (two) times daily with meals.      ondansetron (ZOFRAN) 4 MG tablet Take 1 tablet (4 mg total) by mouth every 8 (eight) hours as needed for Nausea. 12 tablet 0    tramadol (ULTRAM) 50 mg tablet Take 50 mg by mouth every 6 (six)  "hours as needed for Pain.      colchicine (COLCRYS) 0.6 mg tablet Take 1 tablet (0.6 mg total) by mouth once daily. 30 tablet 0     No current facility-administered medications for this visit.       Past Medical History:   Diagnosis Date    Diabetes mellitus     Gout, unspecified     Hypertension        Past Surgical History:   Procedure Laterality Date    CHOLECYSTECTOMY      KNEE ARTHROPLASTY      bilateral       Review of Systems:  Constitutional: Negative for chills and fever.   Respiratory: Negative for cough and shortness of breath.    Gastrointestinal: Negative for nausea and vomiting.   Skin: Negative for rash.   Neurological: Negative for dizziness and headaches.   Psychiatric/Behavioral: Negative for depression.   MSK as in HPI       OBJECTIVE:     PHYSICAL EXAM:  Ht 5' 8" (1.727 m)   Wt 98.9 kg (218 lb 0.6 oz)   BMI 33.15 kg/m²     GEN:  NAD, well-developed, well-groomed.  NEURO: Awake, alert, and oriented. Normal attention and concentration.    PSYCH: Normal mood and affect. Behavior is normal.  HEENT: No cervical lymphadenopathy noted.  CARDIOVASCULAR: Radial pulses 2+ bilaterally. No LE edema noted.  PULMONARY: Breath sounds normal. No respiratory distress.  SKIN: Intact, no rashes.      MSK:     RUE:  Nontender throughout wrist    Good active ROM of the wrist and fingers. AIN/PIN/Radial/Median/Ulnar Nerves assessed in isolation without deficit. Radial & Ulnar arteries palpated 2+. Capillary Refill <3s.    RADIOGRAPHS:  12/19/23  R Wrist XR  FINDINGS:  Wrist complete three views right.     There is baseline DJD.  No fracture dislocation bone destruction seen.  There is an ulnar styloid ossicle.    Comments: I have personally reviewed the imaging and I agree with the above radiologist's report.    ASSESSMENT/PLAN:       ICD-10-CM ICD-9-CM   1. Closed fracture of distal end of right ulna, unspecified fracture morphology, initial encounter  S52.601A 813.43   2. Right wrist pain  M25.531 719.43 "       Orders Placed This Encounter    Ambulatory referral/consult to Physical/Occupational Therapy     Orders Placed This Encounter   Procedures    Ambulatory referral/consult to Physical/Occupational Therapy        Plan:   Patient reports today doing much better status post cast removal.  He has good strength and range of motion of the wrist and fingers.  We will plan for therapy to address any strength deficits.  He will return to clinic p.r.n.    The patient indicates understanding of these issues and agrees to the plan.    Mike Samson PA-C, ATC  [facility]   [facility] [place], [place]    Disclaimer: This note has been generated using voice-recognition software. There may be typographical errors that have been missed during proof-reading.

## 2023-12-22 DIAGNOSIS — M25.531 RIGHT WRIST PAIN: Primary | ICD-10-CM

## 2024-01-26 ENCOUNTER — TELEPHONE (OUTPATIENT)
Dept: ORTHOPEDICS | Facility: CLINIC | Age: 69
End: 2024-01-26
Payer: MEDICARE

## 2025-01-20 ENCOUNTER — HOSPITAL ENCOUNTER (EMERGENCY)
Facility: OTHER | Age: 70
Discharge: HOME | End: 2025-01-20
Attending: EMERGENCY MEDICINE
Payer: MEDICARE

## 2025-01-20 VITALS
OXYGEN SATURATION: 98 % | RESPIRATION RATE: 18 BRPM | HEIGHT: 68 IN | HEART RATE: 78 BPM | WEIGHT: 195 LBS | BODY MASS INDEX: 29.55 KG/M2 | TEMPERATURE: 99 F

## 2025-01-20 DIAGNOSIS — R05.9 COUGH: ICD-10-CM

## 2025-01-20 DIAGNOSIS — R06.2 WHEEZING: Primary | ICD-10-CM

## 2025-01-20 DIAGNOSIS — M25.531 RIGHT WRIST PAIN: ICD-10-CM

## 2025-01-20 PROCEDURE — 99284 EMERGENCY DEPT VISIT MOD MDM: CPT | Mod: 25

## 2025-01-20 RX ORDER — ALBUTEROL SULFATE 90 UG/1
1-2 INHALANT RESPIRATORY (INHALATION) EVERY 6 HOURS PRN
Qty: 18 G | Refills: 0 | Status: SHIPPED | OUTPATIENT
Start: 2025-01-20

## 2025-01-20 NOTE — ED TRIAGE NOTES
68 yo male reports to ed with co R wrist pain for the past two days. Denies recent injury or trauma but reports a R wrist fracture on 10/15/2024. [id number]

## 2025-01-20 NOTE — ED NOTES
Provider and nurse attempted to find pt to discharge him. Pt not found in [facility]. Unable to get discharge vitals and review papers. Will wait to see if pt returns